# Patient Record
Sex: FEMALE | Race: BLACK OR AFRICAN AMERICAN
[De-identification: names, ages, dates, MRNs, and addresses within clinical notes are randomized per-mention and may not be internally consistent; named-entity substitution may affect disease eponyms.]

---

## 2018-10-08 ENCOUNTER — HOSPITAL ENCOUNTER (OUTPATIENT)
Dept: HOSPITAL 92 - BICMAMMO | Age: 73
Discharge: HOME | End: 2018-10-08
Attending: FAMILY MEDICINE
Payer: MEDICARE

## 2018-10-08 DIAGNOSIS — R92.1: ICD-10-CM

## 2018-10-08 DIAGNOSIS — Z80.3: ICD-10-CM

## 2018-10-08 DIAGNOSIS — Z12.31: Primary | ICD-10-CM

## 2018-10-08 PROCEDURE — 77063 BREAST TOMOSYNTHESIS BI: CPT

## 2018-10-08 PROCEDURE — 77067 SCR MAMMO BI INCL CAD: CPT

## 2019-07-16 ENCOUNTER — HOSPITAL ENCOUNTER (OUTPATIENT)
Dept: HOSPITAL 92 - BICMAMMO | Age: 74
Discharge: HOME | End: 2019-07-16
Attending: FAMILY MEDICINE
Payer: MEDICARE

## 2019-07-16 DIAGNOSIS — Z12.31: Primary | ICD-10-CM

## 2019-07-16 PROCEDURE — 77067 SCR MAMMO BI INCL CAD: CPT

## 2019-07-16 PROCEDURE — 77063 BREAST TOMOSYNTHESIS BI: CPT

## 2019-07-16 NOTE — MMO
Bilateral MAMMO Bilat Screen [facility].

 

CLINICAL HISTORY:

Patient is 74 years old and is seen for screening.   The patient has no personal

history of cancer.

 

VIEWS:

The views performed were:  bilateral craniocaudal with tomosynthesis and

bilateral mediolateral oblique with tomosynthesis.

 

FILMS COMPARED:

The present examination has been compared to a prior imaging study performed at

[facility] on 10/08/2018.

 

MAMMOGRAM FINDINGS:

There are scattered fibroglandular densities.

 

There are stable benign appearing calcifications seen in both breasts.

 

There are no suspicious masses, suspicious calcifications, or new areas of

architectural distortion.

 

IMPRESSION:

THERE IS NO MAMMOGRAPHIC EVIDENCE OF MALIGNANCY.

 

A ROUTINE FOLLOW-UP MAMMOGRAM IN 1 YEAR IS RECOMMENDED.

 

THE RESULTS OF THIS EXAM WERE SENT TO THE PATIENT.

 

ACR BI-RADS Category 2 - Benign finding

 

MAMMOGRAPHY NOTE:

 1. A negative mammogram report should not delay a biopsy if a dominant of

 clinically suspicious mass is present.

 2. Approximately 10% to 15% of breast cancers are not detected by

 mammography.

 3. Adenosis and dense breasts may obscure an underlying neoplasm.

 

 

Reported by: WILEY BARLOW MD    Electonically Signed: [id number]

## 2020-07-27 ENCOUNTER — HOSPITAL ENCOUNTER (OUTPATIENT)
Dept: HOSPITAL 92 - BICULT | Age: 75
Discharge: HOME | End: 2020-07-27
Attending: FAMILY MEDICINE
Payer: MEDICARE

## 2020-07-27 DIAGNOSIS — R59.0: Primary | ICD-10-CM

## 2020-07-27 PROCEDURE — 76999 ECHO EXAMINATION PROCEDURE: CPT

## 2020-08-06 ENCOUNTER — HOSPITAL ENCOUNTER (OUTPATIENT)
Dept: HOSPITAL 92 - LABBT | Age: 75
Discharge: HOME | End: 2020-08-06
Attending: SPECIALIST
Payer: MEDICARE

## 2020-08-06 DIAGNOSIS — Z01.818: Primary | ICD-10-CM

## 2020-08-06 DIAGNOSIS — Z11.59: ICD-10-CM

## 2020-08-06 DIAGNOSIS — I70.0: ICD-10-CM

## 2020-08-06 DIAGNOSIS — R59.1: ICD-10-CM

## 2020-08-06 LAB
ANION GAP SERPL CALC-SCNC: 12 MMOL/L (ref 10–20)
BASOPHILS # BLD AUTO: 0 THOU/UL (ref 0–0.2)
BASOPHILS NFR BLD AUTO: 0.3 % (ref 0–1)
BUN SERPL-MCNC: 15 MG/DL (ref 9.8–20.1)
CALCIUM SERPL-MCNC: 9.8 MG/DL (ref 7.8–10.44)
CHLORIDE SERPL-SCNC: 103 MMOL/L (ref 98–107)
CO2 SERPL-SCNC: 31 MMOL/L (ref 23–31)
CREAT CL PREDICTED SERPL C-G-VRATE: 0 ML/MIN (ref 70–130)
EOSINOPHIL # BLD AUTO: 0.3 THOU/UL (ref 0–0.7)
EOSINOPHIL NFR BLD AUTO: 3.7 % (ref 0–10)
GLUCOSE SERPL-MCNC: 95 MG/DL (ref 83–110)
HGB BLD-MCNC: 12 G/DL (ref 12–16)
LYMPHOCYTES # BLD: 1.7 THOU/UL (ref 1.2–3.4)
LYMPHOCYTES NFR BLD AUTO: 21.7 % (ref 21–51)
MCH RBC QN AUTO: 29.8 PG (ref 27–31)
MCV RBC AUTO: 94 FL (ref 78–98)
MONOCYTES # BLD AUTO: 0.7 THOU/UL (ref 0.11–0.59)
MONOCYTES NFR BLD AUTO: 8.6 % (ref 0–10)
NEUTROPHILS # BLD AUTO: 5 THOU/UL (ref 1.4–6.5)
NEUTROPHILS NFR BLD AUTO: 65.7 % (ref 42–75)
PLATELET # BLD AUTO: 244 THOU/UL (ref 130–400)
POTASSIUM SERPL-SCNC: 3.5 MMOL/L (ref 3.5–5.1)
RBC # BLD AUTO: 4.03 MILL/UL (ref 4.2–5.4)
SODIUM SERPL-SCNC: 142 MMOL/L (ref 136–145)
WBC # BLD AUTO: 7.6 THOU/UL (ref 4.8–10.8)

## 2020-08-06 PROCEDURE — 87635 SARS-COV-2 COVID-19 AMP PRB: CPT

## 2020-08-06 PROCEDURE — U0003 INFECTIOUS AGENT DETECTION BY NUCLEIC ACID (DNA OR RNA); SEVERE ACUTE RESPIRATORY SYNDROME CORONAVIRUS 2 (SARS-COV-2) (CORONAVIRUS DISEASE [COVID-19]), AMPLIFIED PROBE TECHNIQUE, MAKING USE OF HIGH THROUGHPUT TECHNOLOGIES AS DESCRIBED BY CMS-2020-01-R: HCPCS

## 2020-08-06 PROCEDURE — 85025 COMPLETE CBC W/AUTO DIFF WBC: CPT

## 2020-08-06 PROCEDURE — 71046 X-RAY EXAM CHEST 2 VIEWS: CPT

## 2020-08-06 PROCEDURE — 80048 BASIC METABOLIC PNL TOTAL CA: CPT

## 2020-08-06 NOTE — RAD
CHEST 2 VIEWS:

 

Date:  08/06/2020

 

HISTORY:  

Preoperative evaluation. 

 

FINDINGS:

Heart size is within upper range of normal. Mild increased linear and interstitial markings bilateral
ly without confluent pneumonia, overt edema, or pleural effusion. 

 

IMPRESSION: 

No significant acute intrathoracic disease. Atherosclerosis of aorta. 

 

 

POS: RRE

## 2020-09-14 ENCOUNTER — HOSPITAL ENCOUNTER (OUTPATIENT)
Dept: HOSPITAL 92 - LABBT | Age: 75
Discharge: HOME | End: 2020-09-14
Attending: INTERNAL MEDICINE
Payer: MEDICARE

## 2020-09-14 DIAGNOSIS — I48.91: ICD-10-CM

## 2020-09-14 DIAGNOSIS — Z01.818: Primary | ICD-10-CM

## 2020-09-14 DIAGNOSIS — Z20.828: ICD-10-CM

## 2020-09-14 LAB
ANION GAP SERPL CALC-SCNC: 12 MMOL/L (ref 10–20)
BASOPHILS # BLD AUTO: 0 THOU/UL (ref 0–0.2)
BASOPHILS NFR BLD AUTO: 0.4 % (ref 0–1)
BUN SERPL-MCNC: 16 MG/DL (ref 9.8–20.1)
CALCIUM SERPL-MCNC: 8.9 MG/DL (ref 7.8–10.44)
CHLORIDE SERPL-SCNC: 106 MMOL/L (ref 98–107)
CO2 SERPL-SCNC: 27 MMOL/L (ref 23–31)
CREAT CL PREDICTED SERPL C-G-VRATE: 0 ML/MIN (ref 70–130)
EOSINOPHIL # BLD AUTO: 0.5 THOU/UL (ref 0–0.7)
EOSINOPHIL NFR BLD AUTO: 7.6 % (ref 0–10)
GLUCOSE SERPL-MCNC: 81 MG/DL (ref 83–110)
HGB BLD-MCNC: 11.5 G/DL (ref 12–16)
LYMPHOCYTES # BLD: 1.5 THOU/UL (ref 1.2–3.4)
LYMPHOCYTES NFR BLD AUTO: 24.7 % (ref 21–51)
MCH RBC QN AUTO: 28.3 PG (ref 27–31)
MCV RBC AUTO: 95.9 FL (ref 78–98)
MDIFF COMPLETE?: YES
MONOCYTES # BLD AUTO: 0.6 THOU/UL (ref 0.11–0.59)
MONOCYTES NFR BLD AUTO: 10.5 % (ref 0–10)
NEUTROPHILS # BLD AUTO: 3.5 THOU/UL (ref 1.4–6.5)
NEUTROPHILS NFR BLD AUTO: 56.8 % (ref 42–75)
OVALOCYTES BLD QL SMEAR: (no result) (100X)
PLATELET # BLD AUTO: 228 THOU/UL (ref 130–400)
POLYCHROMASIA BLD QL SMEAR: (no result) (100X)
POTASSIUM SERPL-SCNC: 3.6 MMOL/L (ref 3.5–5.1)
RBC # BLD AUTO: 4.06 MILL/UL (ref 4.2–5.4)
SODIUM SERPL-SCNC: 141 MMOL/L (ref 136–145)
WBC # BLD AUTO: 6.2 THOU/UL (ref 4.8–10.8)

## 2020-09-14 PROCEDURE — U0003 INFECTIOUS AGENT DETECTION BY NUCLEIC ACID (DNA OR RNA); SEVERE ACUTE RESPIRATORY SYNDROME CORONAVIRUS 2 (SARS-COV-2) (CORONAVIRUS DISEASE [COVID-19]), AMPLIFIED PROBE TECHNIQUE, MAKING USE OF HIGH THROUGHPUT TECHNOLOGIES AS DESCRIBED BY CMS-2020-01-R: HCPCS

## 2020-09-14 PROCEDURE — 87635 SARS-COV-2 COVID-19 AMP PRB: CPT

## 2020-09-14 PROCEDURE — 80048 BASIC METABOLIC PNL TOTAL CA: CPT

## 2020-09-14 PROCEDURE — 85025 COMPLETE CBC W/AUTO DIFF WBC: CPT

## 2020-09-14 PROCEDURE — 93010 ELECTROCARDIOGRAM REPORT: CPT

## 2020-09-14 PROCEDURE — 93005 ELECTROCARDIOGRAM TRACING: CPT

## 2020-09-14 NOTE — EKG
Test Reason : 

Blood Pressure : ***/*** mmHG

Vent. Rate : 068 BPM     Atrial Rate : 073 BPM

   P-R Int : 000 ms          QRS Dur : 088 ms

    QT Int : 438 ms       P-R-T Axes : 000 -35 -77 degrees

   QTc Int : 465 ms

 

Atrial fibrillation

Left axis deviation

Low voltage QRS

Nonspecific ST and T wave abnormality

Abnormal ECG

When compared with ECG of 11-AUG-2020 10:18,

No significant change was found

Confirmed by FIDENCIO ADAME M.D. ([id number]) on 9/14/2020 3:23:16 PM

 

Referred By:  WENDI           Confirmed By:FIDENCIO ADAME M.D.

## 2020-09-17 ENCOUNTER — HOSPITAL ENCOUNTER (OUTPATIENT)
Dept: HOSPITAL 92 - CCL | Age: 75
Discharge: HOME | End: 2020-09-17
Attending: INTERNAL MEDICINE
Payer: MEDICARE

## 2020-09-17 VITALS — BODY MASS INDEX: 30.9 KG/M2

## 2020-09-17 DIAGNOSIS — Z79.82: ICD-10-CM

## 2020-09-17 DIAGNOSIS — I10: ICD-10-CM

## 2020-09-17 DIAGNOSIS — I48.19: Primary | ICD-10-CM

## 2020-09-17 DIAGNOSIS — Z91.040: ICD-10-CM

## 2020-09-17 DIAGNOSIS — Z53.9: ICD-10-CM

## 2020-09-17 DIAGNOSIS — E78.5: ICD-10-CM

## 2020-09-17 DIAGNOSIS — Z88.8: ICD-10-CM

## 2020-09-17 DIAGNOSIS — Z88.5: ICD-10-CM

## 2020-09-17 DIAGNOSIS — Z79.01: ICD-10-CM

## 2020-09-17 DIAGNOSIS — M85.80: ICD-10-CM

## 2020-09-17 DIAGNOSIS — Z79.899: ICD-10-CM

## 2020-09-17 DIAGNOSIS — M06.9: ICD-10-CM

## 2020-09-17 PROCEDURE — 93005 ELECTROCARDIOGRAM TRACING: CPT

## 2020-09-17 PROCEDURE — 93010 ELECTROCARDIOGRAM REPORT: CPT

## 2020-09-21 NOTE — EKG
Test Reason : RHYTHM COMFIRMATION

Blood Pressure : ***/*** mmHG

Vent. Rate : 055 BPM     Atrial Rate : 055 BPM

   P-R Int : 302 ms          QRS Dur : 092 ms

    QT Int : 434 ms       P-R-T Axes : 066 -27 -71 degrees

   QTc Int : 415 ms

 

Sinus bradycardia with 1st degree A-V block

Low voltage QRS

Nonspecific T wave abnormality

Abnormal ECG

When compared with ECG of 14-SEP-2020 12:56,

Sinus rhythm has replaced Atrial fibrillation

QT has shortened

Confirmed by FIDENCIO ADAME M.D. ([id number]) on 9/21/2020 3:15:43 PM

 

Referred By:  WENDI           Confirmed By:FIDENCIO ADAME M.D.

## 2023-01-10 NOTE — ULT
ULTRASOUND SOFT TISSUE OTHER 

(BILATERAL AXILLAE):

 

HISTORY: Abnormal mammograms

 

FINDINGS: 

Correlation is made with the mammograms of 7/17/2020.

 

Sonographic evaluation of the axillae was performed bilaterally.  Multiple enlarged lymph nodes are s
een measuring up to 2.2 cm on the right and 3.5 cm on the left.  

 

Further evaluation with ultrasound-guided biopsy is recommended.  The PET scan prior to the biopsy ma
y also be helpful.

 

IMPRESSION: 

BIRADS category 4 - suspicious abnormality.  Ultrasound-guided biopsy of the enlarged lymph nodes is 
recommended of representative lymph nodes of the bilateral axillary lymphadenopathy is recommended.

 

POS: OFF
None